# Patient Record
Sex: MALE
[De-identification: names, ages, dates, MRNs, and addresses within clinical notes are randomized per-mention and may not be internally consistent; named-entity substitution may affect disease eponyms.]

---

## 2018-04-05 ENCOUNTER — HOSPITAL ENCOUNTER (OUTPATIENT)
Dept: HOSPITAL 90 - RAH | Age: 78
Discharge: HOME | End: 2018-04-05
Attending: INTERNAL MEDICINE
Payer: COMMERCIAL

## 2018-04-05 DIAGNOSIS — M48.07: ICD-10-CM

## 2018-04-05 DIAGNOSIS — M51.36: Primary | ICD-10-CM

## 2018-04-05 PROCEDURE — 72100 X-RAY EXAM L-S SPINE 2/3 VWS: CPT

## 2019-07-11 ENCOUNTER — HOSPITAL ENCOUNTER (OUTPATIENT)
Dept: HOSPITAL 90 - RAH | Age: 79
Discharge: HOME | End: 2019-07-11
Attending: FAMILY MEDICINE
Payer: COMMERCIAL

## 2019-07-11 DIAGNOSIS — E04.1: Primary | ICD-10-CM

## 2019-07-11 PROCEDURE — 76536 US EXAM OF HEAD AND NECK: CPT

## 2025-02-13 ENCOUNTER — HOSPITAL ENCOUNTER (OUTPATIENT)
Dept: HOSPITAL 90 - RAH | Age: 85
Discharge: HOME | End: 2025-02-13
Attending: STUDENT IN AN ORGANIZED HEALTH CARE EDUCATION/TRAINING PROGRAM
Payer: COMMERCIAL

## 2025-02-13 DIAGNOSIS — K76.89: ICD-10-CM

## 2025-02-13 DIAGNOSIS — M47.815: ICD-10-CM

## 2025-02-13 DIAGNOSIS — J47.9: ICD-10-CM

## 2025-02-13 DIAGNOSIS — J84.9: ICD-10-CM

## 2025-02-13 DIAGNOSIS — R10.33: ICD-10-CM

## 2025-02-13 DIAGNOSIS — K44.9: Primary | ICD-10-CM

## 2025-02-13 DIAGNOSIS — I70.0: ICD-10-CM

## 2025-02-13 PROCEDURE — 74176 CT ABD & PELVIS W/O CONTRAST: CPT

## 2025-02-13 NOTE — [facility]
CT ABDOMEN/PELVIS W/O CONTRAST



HISTORY:  Pain



COMPARISON: 9/30/2017



TECHNIQUE: Multiple sequential axial images of the abdomen and pelvis

were obtained from the dome of the diaphragm through symphysis pubis.

Patient was not given contrast through intravenous route. Oral contrast

was not given.



FINDINGS:  No pleural effusion is seen bilaterally.  Bilateral pulmonary

infiltrates are seen with bronchiectasis. Interstitial fibrotic changes

are seen. There is hiatal hernia. Coronary arterial calcifications are

seen.  Degenerative changes of the thoracolumbar spine are present. The

heart is not enlarged.



Hepatic cysts are seen. 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

measuring 5.4 x 5.2 cm which is increased in size from previous study.

There is left simple renal cyst measuring 3.3 cm. The liver, spleen,

adrenal glands and pancreas are unremarkable.  There is no evidence of

hydronephrosis bilaterally.  No evidence of renal stone is seen.  Fecal

material is seen in the colon.  There are normal size retroperitoneal

and mesenteric lymph nodes.  No ascites is seen.  Atherosclerotic

changes are present.



Pelvic sidewalls are symmetric bilaterally. Bladder is well distended

without wall thickening.



IMPRESSION:

1.  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

cm which is increased in size from previous study. Hiatal hernia.











CT was performed with one or more following dose reduction techniques:

automated exposure control, adjustment of the mA and kv according to

patient's size, or use of a iterative reconstruction technique.